# Patient Record
Sex: MALE | Race: BLACK OR AFRICAN AMERICAN | NOT HISPANIC OR LATINO | Employment: FULL TIME | URBAN - METROPOLITAN AREA
[De-identification: names, ages, dates, MRNs, and addresses within clinical notes are randomized per-mention and may not be internally consistent; named-entity substitution may affect disease eponyms.]

---

## 2024-07-18 ENCOUNTER — APPOINTMENT (OUTPATIENT)
Dept: RADIOLOGY | Facility: HOSPITAL | Age: 54
DRG: 309 | End: 2024-07-18
Payer: COMMERCIAL

## 2024-07-18 ENCOUNTER — APPOINTMENT (OUTPATIENT)
Dept: CARDIOLOGY | Facility: HOSPITAL | Age: 54
DRG: 309 | End: 2024-07-18
Payer: COMMERCIAL

## 2024-07-18 ENCOUNTER — HOSPITAL ENCOUNTER (INPATIENT)
Facility: HOSPITAL | Age: 54
LOS: 1 days | Discharge: HOME | DRG: 309 | End: 2024-07-19
Attending: EMERGENCY MEDICINE | Admitting: INTERNAL MEDICINE
Payer: COMMERCIAL

## 2024-07-18 DIAGNOSIS — I48.92 ATRIAL FLUTTER, UNSPECIFIED TYPE (MULTI): Primary | ICD-10-CM

## 2024-07-18 LAB
ALBUMIN SERPL BCP-MCNC: 3.7 G/DL (ref 3.4–5)
ALP SERPL-CCNC: 94 U/L (ref 33–120)
ALT SERPL W P-5'-P-CCNC: 25 U/L (ref 10–52)
ANION GAP SERPL CALC-SCNC: 14 MMOL/L (ref 10–20)
APTT PPP: 34 SECONDS (ref 27–38)
AST SERPL W P-5'-P-CCNC: 22 U/L (ref 9–39)
BASOPHILS # BLD AUTO: 0.03 X10*3/UL (ref 0–0.1)
BASOPHILS NFR BLD AUTO: 0.6 %
BILIRUB SERPL-MCNC: 0.9 MG/DL (ref 0–1.2)
BNP SERPL-MCNC: 574 PG/ML (ref 0–99)
BUN SERPL-MCNC: 17 MG/DL (ref 6–23)
CALCIUM SERPL-MCNC: 8.2 MG/DL (ref 8.6–10.3)
CARDIAC TROPONIN I PNL SERPL HS: 6 NG/L (ref 0–20)
CHLORIDE SERPL-SCNC: 104 MMOL/L (ref 98–107)
CO2 SERPL-SCNC: 24 MMOL/L (ref 21–32)
CREAT SERPL-MCNC: 1.7 MG/DL (ref 0.5–1.3)
EGFRCR SERPLBLD CKD-EPI 2021: 47 ML/MIN/1.73M*2
EOSINOPHIL # BLD AUTO: 0.06 X10*3/UL (ref 0–0.7)
EOSINOPHIL NFR BLD AUTO: 1.2 %
ERYTHROCYTE [DISTWIDTH] IN BLOOD BY AUTOMATED COUNT: 14.3 % (ref 11.5–14.5)
GLUCOSE SERPL-MCNC: 87 MG/DL (ref 74–99)
HCT VFR BLD AUTO: 40.5 % (ref 41–52)
HGB BLD-MCNC: 13.5 G/DL (ref 13.5–17.5)
IMM GRANULOCYTES # BLD AUTO: 0.02 X10*3/UL (ref 0–0.7)
IMM GRANULOCYTES NFR BLD AUTO: 0.4 % (ref 0–0.9)
LYMPHOCYTES # BLD AUTO: 1.75 X10*3/UL (ref 1.2–4.8)
LYMPHOCYTES NFR BLD AUTO: 33.7 %
MCH RBC QN AUTO: 28.4 PG (ref 26–34)
MCHC RBC AUTO-ENTMCNC: 33.3 G/DL (ref 32–36)
MCV RBC AUTO: 85 FL (ref 80–100)
MONOCYTES # BLD AUTO: 0.57 X10*3/UL (ref 0.1–1)
MONOCYTES NFR BLD AUTO: 11 %
NEUTROPHILS # BLD AUTO: 2.76 X10*3/UL (ref 1.2–7.7)
NEUTROPHILS NFR BLD AUTO: 53.1 %
NRBC BLD-RTO: 0.4 /100 WBCS (ref 0–0)
PLATELET # BLD AUTO: 241 X10*3/UL (ref 150–450)
POTASSIUM SERPL-SCNC: 3.8 MMOL/L (ref 3.5–5.3)
PROT SERPL-MCNC: 6.9 G/DL (ref 6.4–8.2)
RBC # BLD AUTO: 4.76 X10*6/UL (ref 4.5–5.9)
SODIUM SERPL-SCNC: 138 MMOL/L (ref 136–145)
WBC # BLD AUTO: 5.2 X10*3/UL (ref 4.4–11.3)

## 2024-07-18 PROCEDURE — 85025 COMPLETE CBC W/AUTO DIFF WBC: CPT

## 2024-07-18 PROCEDURE — 96366 THER/PROPH/DIAG IV INF ADDON: CPT

## 2024-07-18 PROCEDURE — 83880 ASSAY OF NATRIURETIC PEPTIDE: CPT

## 2024-07-18 PROCEDURE — 96376 TX/PRO/DX INJ SAME DRUG ADON: CPT

## 2024-07-18 PROCEDURE — G0378 HOSPITAL OBSERVATION PER HR: HCPCS

## 2024-07-18 PROCEDURE — 36415 COLL VENOUS BLD VENIPUNCTURE: CPT | Performed by: NURSE PRACTITIONER

## 2024-07-18 PROCEDURE — 2500000004 HC RX 250 GENERAL PHARMACY W/ HCPCS (ALT 636 FOR OP/ED): Performed by: EMERGENCY MEDICINE

## 2024-07-18 PROCEDURE — 80053 COMPREHEN METABOLIC PANEL: CPT

## 2024-07-18 PROCEDURE — 2500000001 HC RX 250 WO HCPCS SELF ADMINISTERED DRUGS (ALT 637 FOR MEDICARE OP): Performed by: EMERGENCY MEDICINE

## 2024-07-18 PROCEDURE — 85520 HEPARIN ASSAY: CPT | Performed by: NURSE PRACTITIONER

## 2024-07-18 PROCEDURE — 96365 THER/PROPH/DIAG IV INF INIT: CPT

## 2024-07-18 PROCEDURE — 85730 THROMBOPLASTIN TIME PARTIAL: CPT | Performed by: EMERGENCY MEDICINE

## 2024-07-18 PROCEDURE — 2500000005 HC RX 250 GENERAL PHARMACY W/O HCPCS

## 2024-07-18 PROCEDURE — 2500000001 HC RX 250 WO HCPCS SELF ADMINISTERED DRUGS (ALT 637 FOR MEDICARE OP): Performed by: NURSE PRACTITIONER

## 2024-07-18 PROCEDURE — 84484 ASSAY OF TROPONIN QUANT: CPT

## 2024-07-18 PROCEDURE — 71045 X-RAY EXAM CHEST 1 VIEW: CPT | Mod: FOREIGN READ | Performed by: RADIOLOGY

## 2024-07-18 PROCEDURE — 83036 HEMOGLOBIN GLYCOSYLATED A1C: CPT | Mod: AHULAB | Performed by: NURSE PRACTITIONER

## 2024-07-18 PROCEDURE — 1200000002 HC GENERAL ROOM WITH TELEMETRY DAILY

## 2024-07-18 PROCEDURE — 71045 X-RAY EXAM CHEST 1 VIEW: CPT

## 2024-07-18 PROCEDURE — 93005 ELECTROCARDIOGRAM TRACING: CPT

## 2024-07-18 PROCEDURE — 99223 1ST HOSP IP/OBS HIGH 75: CPT | Performed by: NURSE PRACTITIONER

## 2024-07-18 PROCEDURE — 96375 TX/PRO/DX INJ NEW DRUG ADDON: CPT

## 2024-07-18 PROCEDURE — 36415 COLL VENOUS BLD VENIPUNCTURE: CPT | Performed by: EMERGENCY MEDICINE

## 2024-07-18 PROCEDURE — 99291 CRITICAL CARE FIRST HOUR: CPT | Performed by: EMERGENCY MEDICINE

## 2024-07-18 PROCEDURE — 99285 EMERGENCY DEPT VISIT HI MDM: CPT | Mod: 25

## 2024-07-18 PROCEDURE — 36415 COLL VENOUS BLD VENIPUNCTURE: CPT

## 2024-07-18 PROCEDURE — 96374 THER/PROPH/DIAG INJ IV PUSH: CPT | Mod: 59

## 2024-07-18 PROCEDURE — 2500000004 HC RX 250 GENERAL PHARMACY W/ HCPCS (ALT 636 FOR OP/ED)

## 2024-07-18 RX ORDER — HEPARIN SODIUM 10000 [USP'U]/100ML
0-4500 INJECTION, SOLUTION INTRAVENOUS CONTINUOUS
Status: DISCONTINUED | OUTPATIENT
Start: 2024-07-18 | End: 2024-07-19

## 2024-07-18 RX ORDER — METOPROLOL TARTRATE 1 MG/ML
INJECTION, SOLUTION INTRAVENOUS
Status: COMPLETED
Start: 2024-07-18 | End: 2024-07-18

## 2024-07-18 RX ORDER — DILTIAZEM HYDROCHLORIDE 5 MG/ML
20 INJECTION INTRAVENOUS ONCE
Status: COMPLETED | OUTPATIENT
Start: 2024-07-18 | End: 2024-07-18

## 2024-07-18 RX ORDER — DILTIAZEM HCL/D5W 125 MG/125
5-15 PLASTIC BAG, INJECTION (ML) INTRAVENOUS CONTINUOUS
Status: DISCONTINUED | OUTPATIENT
Start: 2024-07-18 | End: 2024-07-18

## 2024-07-18 RX ORDER — ACETAMINOPHEN 325 MG/1
650 TABLET ORAL EVERY 4 HOURS PRN
Status: DISCONTINUED | OUTPATIENT
Start: 2024-07-18 | End: 2024-07-19 | Stop reason: HOSPADM

## 2024-07-18 RX ORDER — METOPROLOL TARTRATE 25 MG/1
25 TABLET, FILM COATED ORAL EVERY 8 HOURS
Status: DISCONTINUED | OUTPATIENT
Start: 2024-07-18 | End: 2024-07-18

## 2024-07-18 RX ORDER — METOPROLOL TARTRATE 1 MG/ML
5 INJECTION, SOLUTION INTRAVENOUS ONCE
Status: COMPLETED | OUTPATIENT
Start: 2024-07-18 | End: 2024-07-18

## 2024-07-18 RX ORDER — AMLODIPINE BESYLATE 10 MG/1
10 TABLET ORAL DAILY
Status: DISCONTINUED | OUTPATIENT
Start: 2024-07-18 | End: 2024-07-19 | Stop reason: HOSPADM

## 2024-07-18 RX ORDER — HYDROCHLOROTHIAZIDE 12.5 MG/1
12.5 TABLET ORAL DAILY
COMMUNITY

## 2024-07-18 RX ORDER — LOSARTAN POTASSIUM 100 MG/1
100 TABLET ORAL DAILY
COMMUNITY

## 2024-07-18 RX ORDER — OMEPRAZOLE 40 MG/1
40 CAPSULE, DELAYED RELEASE ORAL
COMMUNITY

## 2024-07-18 RX ORDER — HYDROCHLOROTHIAZIDE 12.5 MG/1
12.5 TABLET ORAL DAILY
Status: DISCONTINUED | OUTPATIENT
Start: 2024-07-18 | End: 2024-07-19 | Stop reason: HOSPADM

## 2024-07-18 RX ORDER — METOPROLOL TARTRATE 25 MG/1
25 TABLET, FILM COATED ORAL EVERY 6 HOURS
Status: DISCONTINUED | OUTPATIENT
Start: 2024-07-18 | End: 2024-07-19

## 2024-07-18 SDOH — SOCIAL STABILITY: SOCIAL INSECURITY: DO YOU FEEL UNSAFE GOING BACK TO THE PLACE WHERE YOU ARE LIVING?: NO

## 2024-07-18 SDOH — SOCIAL STABILITY: SOCIAL INSECURITY: HAS ANYONE EVER THREATENED TO HURT YOUR FAMILY OR YOUR PETS?: NO

## 2024-07-18 SDOH — SOCIAL STABILITY: SOCIAL INSECURITY: DOES ANYONE TRY TO KEEP YOU FROM HAVING/CONTACTING OTHER FRIENDS OR DOING THINGS OUTSIDE YOUR HOME?: NO

## 2024-07-18 SDOH — SOCIAL STABILITY: SOCIAL INSECURITY: HAVE YOU HAD ANY THOUGHTS OF HARMING ANYONE ELSE?: NO

## 2024-07-18 SDOH — SOCIAL STABILITY: SOCIAL INSECURITY: DO YOU FEEL ANYONE HAS EXPLOITED OR TAKEN ADVANTAGE OF YOU FINANCIALLY OR OF YOUR PERSONAL PROPERTY?: NO

## 2024-07-18 SDOH — SOCIAL STABILITY: SOCIAL INSECURITY: ARE THERE ANY APPARENT SIGNS OF INJURIES/BEHAVIORS THAT COULD BE RELATED TO ABUSE/NEGLECT?: NO

## 2024-07-18 SDOH — SOCIAL STABILITY: SOCIAL INSECURITY: ABUSE: ADULT

## 2024-07-18 SDOH — SOCIAL STABILITY: SOCIAL INSECURITY: WERE YOU ABLE TO COMPLETE ALL THE BEHAVIORAL HEALTH SCREENINGS?: YES

## 2024-07-18 SDOH — SOCIAL STABILITY: SOCIAL INSECURITY: ARE YOU OR HAVE YOU BEEN THREATENED OR ABUSED PHYSICALLY, EMOTIONALLY, OR SEXUALLY BY ANYONE?: NO

## 2024-07-18 SDOH — SOCIAL STABILITY: SOCIAL INSECURITY: HAVE YOU HAD THOUGHTS OF HARMING ANYONE ELSE?: NO

## 2024-07-18 ASSESSMENT — COGNITIVE AND FUNCTIONAL STATUS - GENERAL
DAILY ACTIVITIY SCORE: 24
PATIENT BASELINE BEDBOUND: NO
MOBILITY SCORE: 24

## 2024-07-18 ASSESSMENT — ACTIVITIES OF DAILY LIVING (ADL)
ADEQUATE_TO_COMPLETE_ADL: YES
TOILETING: INDEPENDENT
LACK_OF_TRANSPORTATION: NO
BATHING: INDEPENDENT
GROOMING: INDEPENDENT
HEARING - RIGHT EAR: FUNCTIONAL
FEEDING YOURSELF: INDEPENDENT
WALKS IN HOME: INDEPENDENT
JUDGMENT_ADEQUATE_SAFELY_COMPLETE_DAILY_ACTIVITIES: YES
PATIENT'S MEMORY ADEQUATE TO SAFELY COMPLETE DAILY ACTIVITIES?: YES
DRESSING YOURSELF: INDEPENDENT
HEARING - LEFT EAR: FUNCTIONAL

## 2024-07-18 ASSESSMENT — LIFESTYLE VARIABLES
HOW OFTEN DO YOU HAVE 6 OR MORE DRINKS ON ONE OCCASION: NEVER
HOW MANY STANDARD DRINKS CONTAINING ALCOHOL DO YOU HAVE ON A TYPICAL DAY: 1 OR 2
SKIP TO QUESTIONS 9-10: 1
AUDIT-C TOTAL SCORE: 1
AUDIT-C TOTAL SCORE: 1
HOW OFTEN DO YOU HAVE A DRINK CONTAINING ALCOHOL: MONTHLY OR LESS

## 2024-07-18 ASSESSMENT — COLUMBIA-SUICIDE SEVERITY RATING SCALE - C-SSRS
1. IN THE PAST MONTH, HAVE YOU WISHED YOU WERE DEAD OR WISHED YOU COULD GO TO SLEEP AND NOT WAKE UP?: NO
6. HAVE YOU EVER DONE ANYTHING, STARTED TO DO ANYTHING, OR PREPARED TO DO ANYTHING TO END YOUR LIFE?: NO
2. HAVE YOU ACTUALLY HAD ANY THOUGHTS OF KILLING YOURSELF?: NO

## 2024-07-18 ASSESSMENT — ENCOUNTER SYMPTOMS
ACTIVITY CHANGE: 1
PALPITATIONS: 1
SHORTNESS OF BREATH: 1
MUSCULOSKELETAL NEGATIVE: 1
PSYCHIATRIC NEGATIVE: 1
NEUROLOGICAL NEGATIVE: 1

## 2024-07-18 ASSESSMENT — PAIN - FUNCTIONAL ASSESSMENT: PAIN_FUNCTIONAL_ASSESSMENT: 0-10

## 2024-07-18 ASSESSMENT — PATIENT HEALTH QUESTIONNAIRE - PHQ9
2. FEELING DOWN, DEPRESSED OR HOPELESS: NOT AT ALL
SUM OF ALL RESPONSES TO PHQ9 QUESTIONS 1 & 2: 0
1. LITTLE INTEREST OR PLEASURE IN DOING THINGS: NOT AT ALL

## 2024-07-18 ASSESSMENT — PAIN SCALES - GENERAL: PAINLEVEL_OUTOF10: 0 - NO PAIN

## 2024-07-18 NOTE — ED TRIAGE NOTES
Patient to ED for c/o rapid heart rate. Patient states he was at work and felt palpitations. Patient denies CP but c/o sob. Patient denies being on blood thinners. Patient with hx of hypertension.

## 2024-07-18 NOTE — ED PROVIDER NOTES
Emergency Department Provider Note        History of Present Illness     History provided by: Patient  Limitations to History: None  External Records Reviewed with Brief Summary: None    HPI:  Alonso Lackey is a 54 y.o. male with PMHx htn, CKD, SVT who presents to ED with palpitations and shortness of breath. He reports the symptoms started when he woke up. Denies chest pain/pressure, abd pain, lightheadedness.     No history of MI, no known family history of WPW or sudden cardiac death. He was seen by cardiology on 24 and at that time LV EF 62% with grade I diastolic dysfunction.    Physical Exam   Triage vitals:  T 36.7 °C (98.1 °F)  HR (!) 163  BP (!) 130/109  RR 20  O2 99 % None (Room air)    Physical exam:   Triage vitals reviewed.  Constitutional: Well developed adult in no acute distress, non toxic in appearance  Head: Normocephalic, atraumatic  Skin: Intact, dry. No rashes or lesions.  Eyes: Pupils are equal. No conjunctival injection.  Neck: Supple. Trachea is midline.  Pulmonary: Normal work of breathing with no accessory muscle use noted.  Clear to auscultation bilaterally.   Cardiovascular: Tachycardia, regular rhythm. No murmurs/gallops/rubs appreciated. 2+ radial pulses bilaterally.   Abdomen: Soft, nondistended. Nontender to palpation.  Extremities: No gross deformities.  Moving all extremities spontaneously without difficulty.  Neuro: Awake and alert. Face is symmetric. Speech is clear. No obvious focal findings.  Psych: Appropriate mood and affect.      Medical Decision Making & ED Course   Medical Decision Makin y.o. male with PMHx PMHx htn, CKD, SVT who presents to ED with palpitations. On arrival, patient was afebrile, normotensive, with HR 160s and SpO2 99% on RA. Initial EKG concerning for atrial flutter with 2:1 block versus SVT. We were unable to convert the patient with vagal maneuvers, however flutter waves were visible on the twelve-lead monitor while performing vagal  maneuvers, suggesting atrial flutter rather than SVT.  Patient was given 20 mg diltiazem, and briefly converted to normal sinus rhythm, however his heart rate continued to rise, and he went back into atrial flutter.  Patient was started on diltiazem drip and cardiology was consulted.     Lab workup was significant for .  EKG without evidence of ischemia, and troponin was within normal limits, so low concern for acute MI.  CBC without evidence of leukocytosis or anemia, which is reassuring against infection. CMP significant for KVNG with Cr 1.7 (baseline 1.5).    Cardiology recommended stopping diltiazem drip given elevated BNP, and starting metoprolol titrate 25 mg BID and anticoagulation with heparin gtt.     I discussed the likely diagnosis and the implications of this with the patient, who expressed understanding.  I recommended that he be admitted to the hospital for management, and we discussed the risk of stasis and clot formation in atrial flutter.  Ultimately, patient was agreeable with admission.    Patient was discussed with internal medicine, and was accepted for admission.  ----    Differential diagnoses considered include but are not limited to: SVT, atrial flutter, PE     Social Determinants of Health which Significantly Impact Care: None identified     EKG Independent Interpretation: EKG interpreted by myself. Please see ED Course for full interpretation.    Independent Result Review and Interpretation: Relevant laboratory and radiographic results were reviewed and independently interpreted by myself.  As necessary, they are commented on in the ED Course.    Chronic conditions affecting the patient's care: As documented above in [facility]    The patient was discussed with the following consultants/services: Hospitalist/Admitting Provider who accepted the patient for admission    Care Considerations: As documented above in [facility]    ED Course:  ED Course as of 07/18/24 2020   Thu Jul 18, 2024   1548 ECG  12 lead  I independently interpreted the EKG (EM resident): 163 bpm. Atrial flutter with 2:1 block vs narrow-complex SVT. Normal Qtc interval. No ST segment elevations, depressions, or T wave inversions concerning for ischemia. No prior EKG available for comparison. []   1752 ECG 12 lead  I independently interpreted the EKG (EM resident): 85 bpm, atrial flutter with variable AV block and occasional PVCs. QTc 509. No ST segment elevations, depressions, or T wave inversions concerning for ischemia. Compared to prior EKG on 7/18/24, atrial flutter now with variable block and ventricular rate is normal.   []   1819 BNP(!): 574 []   1819 XR chest 1 view  I independently interpreted the CXR without evidence of PTX, PNA, or widened mediastinum. []      ED Course User Index  [HH] Alida Garg MD         Diagnoses as of 07/18/24 2020   Atrial flutter, unspecified type (Multi)     Disposition   As a result of their workup, the patient will require admission to the hospital.  The patient was informed of his diagnosis.  The patient was given the opportunity to ask questions and I answered them. The patient agreed to be admitted to the hospital.    Patient seen and discussed with ED attending physician Dr. Gar.    Alida Garg MD  Emergency Medicine       Alida Garg MD  Resident  07/18/24 2024

## 2024-07-18 NOTE — H&P
History Of Present Illness  Alonso Lackey is a 54 y.o. male with a past medical history of CKD, SVT, hypertension, hemorrhoids presenting with palpitations and shortness of breath that started while patient was at work this a.m.  EKG was obtained upon arrival revealed a flutter with 2-1 AV conduction with a rate of 163 bpm.  Patient was treated with 20 mg of IV Cardizem which improved the rate however he went back up into the 170s and was given 5 mg of IV metoprolol.  It appears patient converted back to normal sinus rhythm noted on ER cardiac monitor, after dose of IV metoprolol.  Patient has been started on a heparin drip.  Patient BUN 17 creatinine 1.7, otherwise CMP unremarkable, troponin 6, , WBC 5.2, hemoglobin 13.5, hematocrit 40.5, platelets 241.  Chest x-ray with no acute cardiopulmonary disease.  Patient reports that shortness of breath improved.  Patient is without chest pain, abdominal pain, nausea, vomiting or diarrhea.  Cardiology has been consulted patient will be admitted for further workup.     Past Medical History  He has a past medical history of Hemorrhage, not elsewhere classified (12/06/2016) and Other hemorrhoids (12/06/2016).    Surgical History  He has no past surgical history on file.     Social History  He has no history on file for tobacco use, alcohol use, and drug use.    Family History  No family history on file.     Allergies  Patient has no allergy information on record.    Review of Systems   Constitutional:  Positive for activity change.   HENT: Negative.     Respiratory:  Positive for shortness of breath.    Cardiovascular:  Positive for palpitations.   Genitourinary: Negative.    Musculoskeletal: Negative.    Skin: Negative.    Neurological: Negative.    Psychiatric/Behavioral: Negative.          Physical Exam  HENT:      Head: Normocephalic.      Mouth/Throat:      Mouth: Mucous membranes are moist.   Cardiovascular:      Rate and Rhythm: Tachycardia present. Rhythm  irregular.      Pulses: Normal pulses.   Pulmonary:      Effort: Pulmonary effort is normal.   Abdominal:      General: Bowel sounds are normal.      Palpations: Abdomen is soft.   Musculoskeletal:         General: Normal range of motion.      Cervical back: Neck supple.   Skin:     General: Skin is warm.   Neurological:      Mental Status: He is alert and oriented to person, place, and time.   Psychiatric:         Mood and Affect: Mood normal.         Behavior: Behavior normal.          Last Recorded Vitals  BP (!) 147/111   Pulse 82   Temp 36.7 °C (98.1 °F)   Resp 20   Wt 113 kg (250 lb)   SpO2 98%     Relevant Results      Results for orders placed or performed during the hospital encounter of 07/18/24 (from the past 24 hour(s))   CBC and Auto Differential   Result Value Ref Range    WBC 5.2 4.4 - 11.3 x10*3/uL    nRBC 0.4 (H) 0.0 - 0.0 /100 WBCs    RBC 4.76 4.50 - 5.90 x10*6/uL    Hemoglobin 13.5 13.5 - 17.5 g/dL    Hematocrit 40.5 (L) 41.0 - 52.0 %    MCV 85 80 - 100 fL    MCH 28.4 26.0 - 34.0 pg    MCHC 33.3 32.0 - 36.0 g/dL    RDW 14.3 11.5 - 14.5 %    Platelets 241 150 - 450 x10*3/uL    Neutrophils % 53.1 40.0 - 80.0 %    Immature Granulocytes %, Automated 0.4 0.0 - 0.9 %    Lymphocytes % 33.7 13.0 - 44.0 %    Monocytes % 11.0 2.0 - 10.0 %    Eosinophils % 1.2 0.0 - 6.0 %    Basophils % 0.6 0.0 - 2.0 %    Neutrophils Absolute 2.76 1.20 - 7.70 x10*3/uL    Immature Granulocytes Absolute, Automated 0.02 0.00 - 0.70 x10*3/uL    Lymphocytes Absolute 1.75 1.20 - 4.80 x10*3/uL    Monocytes Absolute 0.57 0.10 - 1.00 x10*3/uL    Eosinophils Absolute 0.06 0.00 - 0.70 x10*3/uL    Basophils Absolute 0.03 0.00 - 0.10 x10*3/uL   Comprehensive metabolic panel   Result Value Ref Range    Glucose 87 74 - 99 mg/dL    Sodium 138 136 - 145 mmol/L    Potassium 3.8 3.5 - 5.3 mmol/L    Chloride 104 98 - 107 mmol/L    Bicarbonate 24 21 - 32 mmol/L    Anion Gap 14 10 - 20 mmol/L    Urea Nitrogen 17 6 - 23 mg/dL    Creatinine  1.70 (H) 0.50 - 1.30 mg/dL    eGFR 47 (L) >60 mL/min/1.73m*2    Calcium 8.2 (L) 8.6 - 10.3 mg/dL    Albumin 3.7 3.4 - 5.0 g/dL    Alkaline Phosphatase 94 33 - 120 U/L    Total Protein 6.9 6.4 - 8.2 g/dL    AST 22 9 - 39 U/L    Bilirubin, Total 0.9 0.0 - 1.2 mg/dL    ALT 25 10 - 52 U/L   Troponin I, High Sensitivity   Result Value Ref Range    Troponin I, High Sensitivity 6 0 - 20 ng/L   B-Type Natriuretic Peptide   Result Value Ref Range     (H) 0 - 99 pg/mL          Assessment/Plan   Principal Problem:    Atrial flutter, unspecified type (Multi)  Plan:  Metoprolol 25 mg every 6 hours started in the emergency department  Continue heparin drip  cardiology consult  Telemetry  Echocardiogram    Hypertension-continue home amlodipine, hydrochlorothiazide    SVT?  -Patient reports being on a short course of metoprolol after a syncopal episode last year    CKD-monitor      DVT prophylaxis-patient started on heparin drip       Dmitriy Belle, APRN-CNP

## 2024-07-19 ENCOUNTER — APPOINTMENT (OUTPATIENT)
Dept: CARDIOLOGY | Facility: HOSPITAL | Age: 54
DRG: 309 | End: 2024-07-19
Payer: COMMERCIAL

## 2024-07-19 ENCOUNTER — PHARMACY VISIT (OUTPATIENT)
Dept: PHARMACY | Facility: CLINIC | Age: 54
End: 2024-07-19
Payer: MEDICARE

## 2024-07-19 VITALS
RESPIRATION RATE: 24 BRPM | BODY MASS INDEX: 37.36 KG/M2 | OXYGEN SATURATION: 100 % | WEIGHT: 266.9 LBS | HEART RATE: 88 BPM | HEIGHT: 71 IN | DIASTOLIC BLOOD PRESSURE: 90 MMHG | SYSTOLIC BLOOD PRESSURE: 138 MMHG | TEMPERATURE: 98.8 F

## 2024-07-19 LAB
ANION GAP SERPL CALC-SCNC: 11 MMOL/L (ref 10–20)
AORTIC VALVE MEAN GRADIENT: 3 MMHG
AORTIC VALVE PEAK VELOCITY: 1.38 M/S
ATRIAL RATE: 326 BPM
ATRIAL RATE: 340 BPM
AV PEAK GRADIENT: 7.6 MMHG
AVA (PEAK VEL): 1.41 CM2
AVA (VTI): 1.56 CM2
BUN SERPL-MCNC: 16 MG/DL (ref 6–23)
CALCIUM SERPL-MCNC: 8.2 MG/DL (ref 8.6–10.3)
CHLORIDE SERPL-SCNC: 105 MMOL/L (ref 98–107)
CO2 SERPL-SCNC: 25 MMOL/L (ref 21–32)
CREAT SERPL-MCNC: 1.51 MG/DL (ref 0.5–1.3)
EGFRCR SERPLBLD CKD-EPI 2021: 54 ML/MIN/1.73M*2
EJECTION FRACTION APICAL 4 CHAMBER: 64.8
EJECTION FRACTION: 67 %
ERYTHROCYTE [DISTWIDTH] IN BLOOD BY AUTOMATED COUNT: 14.3 % (ref 11.5–14.5)
EST. AVERAGE GLUCOSE BLD GHB EST-MCNC: 123 MG/DL
GLUCOSE SERPL-MCNC: 89 MG/DL (ref 74–99)
HBA1C MFR BLD: 5.9 %
HCT VFR BLD AUTO: 36.8 % (ref 41–52)
HGB BLD-MCNC: 12.2 G/DL (ref 13.5–17.5)
LEFT ATRIUM VOLUME AREA LENGTH INDEX BSA: 29.3 ML/M2
LEFT VENTRICLE INTERNAL DIMENSION DIASTOLE: 5.4 CM (ref 3.5–6)
LEFT VENTRICULAR OUTFLOW TRACT DIAMETER: 2 CM
MAGNESIUM SERPL-MCNC: 1.6 MG/DL (ref 1.6–2.4)
MCH RBC QN AUTO: 28.6 PG (ref 26–34)
MCHC RBC AUTO-ENTMCNC: 33.2 G/DL (ref 32–36)
MCV RBC AUTO: 86 FL (ref 80–100)
MITRAL VALVE E/A RATIO: 1.26
MITRAL VALVE E/E' RATIO: 5.3
NRBC BLD-RTO: 0 /100 WBCS (ref 0–0)
P AXIS: -83 DEGREES
P OFFSET: 172 MS
P ONSET: 121 MS
PLATELET # BLD AUTO: 195 X10*3/UL (ref 150–450)
POTASSIUM SERPL-SCNC: 3.5 MMOL/L (ref 3.5–5.3)
Q ONSET: 211 MS
Q ONSET: 212 MS
QRS COUNT: 14 BEATS
QRS COUNT: 27 BEATS
QRS DURATION: 118 MS
QRS DURATION: 90 MS
QT INTERVAL: 318 MS
QT INTERVAL: 428 MS
QTC CALCULATION(BAZETT): 509 MS
QTC CALCULATION(BAZETT): 523 MS
QTC FREDERICIA: 443 MS
QTC FREDERICIA: 480 MS
R AXIS: -7 DEGREES
R AXIS: 4 DEGREES
RBC # BLD AUTO: 4.27 X10*6/UL (ref 4.5–5.9)
RIGHT VENTRICLE FREE WALL PEAK S': 14.3 CM/S
RIGHT VENTRICLE PEAK SYSTOLIC PRESSURE: 45.8 MMHG
SODIUM SERPL-SCNC: 137 MMOL/L (ref 136–145)
T AXIS: -31 DEGREES
T AXIS: 97 DEGREES
T OFFSET: 371 MS
T OFFSET: 425 MS
TRICUSPID ANNULAR PLANE SYSTOLIC EXCURSION: 2.4 CM
TSH SERPL-ACNC: 0.85 MIU/L (ref 0.44–3.98)
UFH PPP CHRO-ACNC: 0.8 IU/ML
UFH PPP CHRO-ACNC: 1.2 IU/ML
VENTRICULAR RATE: 163 BPM
VENTRICULAR RATE: 85 BPM
WBC # BLD AUTO: 6 X10*3/UL (ref 4.4–11.3)

## 2024-07-19 PROCEDURE — 2500000001 HC RX 250 WO HCPCS SELF ADMINISTERED DRUGS (ALT 637 FOR MEDICARE OP): Performed by: NURSE PRACTITIONER

## 2024-07-19 PROCEDURE — 82374 ASSAY BLOOD CARBON DIOXIDE: CPT | Performed by: NURSE PRACTITIONER

## 2024-07-19 PROCEDURE — 2500000004 HC RX 250 GENERAL PHARMACY W/ HCPCS (ALT 636 FOR OP/ED): Performed by: NURSE PRACTITIONER

## 2024-07-19 PROCEDURE — 85027 COMPLETE CBC AUTOMATED: CPT | Performed by: NURSE PRACTITIONER

## 2024-07-19 PROCEDURE — 83735 ASSAY OF MAGNESIUM: CPT | Performed by: NURSE PRACTITIONER

## 2024-07-19 PROCEDURE — RXMED WILLOW AMBULATORY MEDICATION CHARGE

## 2024-07-19 PROCEDURE — 99222 1ST HOSP IP/OBS MODERATE 55: CPT

## 2024-07-19 PROCEDURE — 96366 THER/PROPH/DIAG IV INF ADDON: CPT | Mod: 59

## 2024-07-19 PROCEDURE — 96375 TX/PRO/DX INJ NEW DRUG ADDON: CPT | Mod: 59

## 2024-07-19 PROCEDURE — 36415 COLL VENOUS BLD VENIPUNCTURE: CPT | Performed by: NURSE PRACTITIONER

## 2024-07-19 PROCEDURE — 85520 HEPARIN ASSAY: CPT | Performed by: NURSE PRACTITIONER

## 2024-07-19 PROCEDURE — 93306 TTE W/DOPPLER COMPLETE: CPT | Performed by: INTERNAL MEDICINE

## 2024-07-19 PROCEDURE — 99239 HOSP IP/OBS DSCHRG MGMT >30: CPT | Performed by: INTERNAL MEDICINE

## 2024-07-19 PROCEDURE — G0378 HOSPITAL OBSERVATION PER HR: HCPCS

## 2024-07-19 PROCEDURE — 84443 ASSAY THYROID STIM HORMONE: CPT | Performed by: NURSE PRACTITIONER

## 2024-07-19 PROCEDURE — 93306 TTE W/DOPPLER COMPLETE: CPT

## 2024-07-19 RX ORDER — METOPROLOL SUCCINATE 100 MG/1
100 TABLET, EXTENDED RELEASE ORAL DAILY
Qty: 30 TABLET | Refills: 2 | Status: SHIPPED | OUTPATIENT
Start: 2024-07-19 | End: 2024-10-17

## 2024-07-19 RX ORDER — LOSARTAN POTASSIUM 50 MG/1
100 TABLET ORAL DAILY
Status: DISCONTINUED | OUTPATIENT
Start: 2024-07-19 | End: 2024-07-19 | Stop reason: HOSPADM

## 2024-07-19 RX ORDER — AMLODIPINE BESYLATE 10 MG/1
10 TABLET ORAL DAILY
Qty: 30 TABLET | Refills: 1 | Status: SHIPPED | OUTPATIENT
Start: 2024-07-20 | End: 2024-09-18

## 2024-07-19 RX ORDER — FUROSEMIDE 10 MG/ML
20 INJECTION INTRAMUSCULAR; INTRAVENOUS ONCE
Status: COMPLETED | OUTPATIENT
Start: 2024-07-19 | End: 2024-07-19

## 2024-07-19 RX ORDER — METOPROLOL SUCCINATE 50 MG/1
100 TABLET, EXTENDED RELEASE ORAL DAILY
Status: DISCONTINUED | OUTPATIENT
Start: 2024-07-19 | End: 2024-07-19 | Stop reason: HOSPADM

## 2024-07-19 ASSESSMENT — COGNITIVE AND FUNCTIONAL STATUS - GENERAL
MOBILITY SCORE: 24
DAILY ACTIVITIY SCORE: 24

## 2024-07-19 ASSESSMENT — ACTIVITIES OF DAILY LIVING (ADL): LACK_OF_TRANSPORTATION: NO

## 2024-07-19 ASSESSMENT — PAIN - FUNCTIONAL ASSESSMENT: PAIN_FUNCTIONAL_ASSESSMENT: 0-10

## 2024-07-19 ASSESSMENT — PAIN SCALES - GENERAL: PAINLEVEL_OUTOF10: 0 - NO PAIN

## 2024-07-19 NOTE — DISCHARGE SUMMARY
Discharge Diagnosis  Atrial flutter, unspecified type (Multi)        Discharge Meds     Your medication list        START taking these medications        Instructions Last Dose Given Next Dose Due   amLODIPine 10 mg tablet  Commonly known as: Norvasc  Start taking on: July 20, 2024      Take 1 tablet (10 mg) by mouth once daily.       apixaban 5 mg tablet  Commonly known as: Eliquis      Take 1 tablet (5 mg) by mouth every 12 hours.       metoprolol succinate  mg 24 hr tablet  Commonly known as: Toprol-XL      Take 1 tablet (100 mg) by mouth once daily. Do not crush or chew.              CONTINUE taking these medications        Instructions Last Dose Given Next Dose Due   hydroCHLOROthiazide 12.5 mg tablet  Commonly known as: Microzide           losartan 100 mg tablet  Commonly known as: Cozaar           omeprazole 40 mg DR capsule  Commonly known as: PriLOSEC                     Where to Get Your Medications        These medications were sent to [facility]  [address], [address], [place] [facility] [address]      Hours: 8 AM to 6 PM Mon-Fri, 9 AM to 1 PM Saturday Phone: [phone]   amLODIPine 10 mg tablet  apixaban 5 mg tablet  metoprolol succinate  mg 24 hr tablet         Test Results Pending At Discharge  Pending Labs       Order Current Status    Hemoglobin A1c In process            Hospital Course   Admitted for new onset a flutter. Now in NSR s/p CCB and BB. Started on eliquis. Echo is pending.     Once echo is complete and no actionable results will plan to dc home with toprol and eliquis along with f/up with cardiology which has been requested.     Pt clinically and hemodynamically stable, tolerating PO intake and room air.   Instructed to F/U with PCP within 5 days.   He understands and agrees with the dc plan.     More than 30 min spent on dc.       Pertinent Physical Exam At Time of Discharge  Physical Exam  Constitutional:       Appearance: Normal appearance.   Cardiovascular:       "Rate and Rhythm: Normal rate and regular rhythm.   Pulmonary:      Effort: Pulmonary effort is normal.      Breath sounds: Normal breath sounds.   Abdominal:      General: Abdomen is flat. Bowel sounds are normal.      Palpations: Abdomen is soft.   Musculoskeletal:      Cervical back: Normal range of motion.   Skin:     General: Skin is warm.   Neurological:      General: No focal deficit present.      Mental Status: He is alert and oriented to person, place, and time. Mental status is at baseline.   Psychiatric:         Mood and Affect: Mood normal.         Behavior: Behavior normal.         Thought Content: Thought content normal.         Judgment: Judgment normal.     /89 (BP Location: Right arm, Patient Position: Lying)   Pulse 88   Temp 37 °C (98.6 °F) (Temporal)   Resp 24   Ht 1.803 m (5' 11\")   Wt 121 kg (266 lb 14.4 oz)   SpO2 100%   BMI 37.22 kg/m²       Outpatient Follow-Up  No future appointments.      Lobo Benavidez MD  "

## 2024-07-19 NOTE — CARE PLAN
The patient's goals for the shift include      The clinical goals for the shift include to decrease heart palpitations      Problem: Fall/Injury  Goal: Not fall by end of shift  Outcome: Progressing  Goal: Be free from injury by end of the shift  Outcome: Progressing  Goal: Verbalize understanding of personal risk factors for fall in the hospital  Outcome: Progressing  Goal: Verbalize understanding of risk factor reduction measures to prevent injury from fall in the home  Outcome: Progressing  Goal: Use assistive devices by end of the shift  Outcome: Progressing  Goal: Pace activities to prevent fatigue by end of the shift  Outcome: Progressing

## 2024-07-19 NOTE — PROGRESS NOTES
Pharmacy Medication History Review    Alonso Lackey is a 54 y.o. male admitted for Atrial flutter, unspecified type (Multi). Pharmacy reviewed the patient's [id number] medications and allergies for accuracy.    The list below reflectives the updated PTA list. Please review each medication in order reconciliation for additional clarification and justification.  Prior to Admission Medications   Prescriptions Last Dose Informant   hydroCHLOROthiazide (Microzide) 12.5 mg tablet Unknown Self   Sig: Take 1 tablet (12.5 mg) by mouth once daily.   losartan (Cozaar) 100 mg tablet Unknown Self   Sig: Take 1 tablet (100 mg) by mouth once daily.   omeprazole (PriLOSEC) 40 mg DR capsule Unknown Self   Sig: Take 1 capsule (40 mg) by mouth once daily in the morning. Take before meals. Do not crush or chew.      Facility-Administered Medications: None         The list below reflectives the updated allergy list. Please review each documented allergy for additional clarification and justification.  Allergies  Reviewed by Benita Ford on 7/18/2024   No Known Allergies         Below are additional concerns with the patient's PTA list.  Spoke to patient with family at bedside.    Benita Ford

## 2024-07-19 NOTE — CONSULTS
Inpatient consult to cardiology  Consult performed by: KARIE Laws  Consult ordered by: KARIE King  Reason for consult: Stable atrial flutter on cardizem drip      History Of Present Illness:    Alonso Lackey is a 54 y.o. male with past medical history significant for Paroxysmal supraventricular tachycardia, Prior syncope, Hypertension, Chronic kidney disease.  Presented with shortness of breath. Cardiology is consulted for stable atrial flutter on Cardizem drip.      Patient reports he noted shortness of breath yesterday. Reports it was worse with exertion. Also endorsed occasional palpitations. Dyspnea on exertion persistent throughout the day.  Did noted some dizziness and lightheadedness on Wednesday but none yesterday. Denies any chest pain, orthopnea, PND, lower extremity edema, syncope, recent fever, cough, congestion, hematochezia or melena.  Given ongoing symptoms he presented to urgent care. At urgent care he was told he had heart rate was 160s so he was referred to the ED for further evaluation.      EKG on presentation showed atrial flutter 2:1 conduction  bpm no acute ischemic changes.  Chest x-ray interpret as no acute cardiopulmonary disease. High sensitivity troponin negative x1  BUN 17 creatinine 1.70 K 3.8     According to ED note, vagal maneuvers were attempted without success.  He was treated with diltiazem 20 mg  IVP. Reportedly he briefly converted to SR but reverted back to atrial flutter and subsequently started on diltiazem and  heparin drip. After discussion with cardiology, diltiazem was discontinued and he was started on  metoprolol tartrate 25 mg every 6 hours.     Follows with cardiology at [facility]. Last seen 2/29/2024 Dr. Alex Clements.  He has been followed for history of SVT. States he was initally started on metoprolol however this was discontinued after he wore a holter monitor (details below). Last echocardiogram done 8/2023 showed LVEF 62%  "grade I diastolic dysfunction.     Home CV meds   Losartan 100 mg daily  Amlodipine 10 mg daily  Hydrochlorothiazide 75 mg daily ??    Last Recorded Vitals:  Vitals:    24 2215 24 2230 24 2316 24 0300   BP: (!) 125/105 (!) 130/102 (!) 144/94 (!) 146/101   BP Location:   Right arm Right arm   Patient Position:    Lying   Pulse: 91 90 88 91   Resp:   20 21   Temp:    37 °C (98.6 °F)   TempSrc:   Temporal Temporal   SpO2: 94% 94% 98% 100%   Weight:   121 kg (266 lb 14.4 oz)    Height:   1.803 m (5' 11\")        Last Labs:  LABS:  CMP:  Results from last 7 days   Lab Units 24  1608   SODIUM mmol/L 138   POTASSIUM mmol/L 3.8   CHLORIDE mmol/L 104   CO2 mmol/L 24   ANION GAP mmol/L 14   BUN mg/dL 17   CREATININE mg/dL 1.70*   EGFR mL/min/1.73m*2 47*   ALBUMIN g/dL 3.7   ALT U/L 25   AST U/L 22   BILIRUBIN TOTAL mg/dL 0.9     CBC:  Results from last 7 days   Lab Units 24  1608   WBC AUTO x10*3/uL 5.2   HEMOGLOBIN g/dL 13.5   HEMATOCRIT % 40.5*   PLATELETS AUTO x10*3/uL 241   MCV fL 85     COAG:     ABO: No results found for: \"ABO\"  HEME/ENDO:     CARDIAC:   Results from last 7 days   Lab Units 24  1608   TROPHS ng/L 6   BNP pg/mL 574*   No results for input(s): \"CHOL\", \"LDLF\", \"LDL\", \"LDLCALC\", \"HDL\", \"TRIG\" in the last 20379 hours.     Imagine Results  XR chest 1 view   Final Result   No acute cardiopulmonary disease.   Signed by Adolfo Osuna MD      Transthoracic Echo (TTE) Complete    (Results Pending)        Last I/O:  No intake/output data recorded.    Past Cardiology Tests (Last 3 Years):  EK2024 16:24  Atrial flutter variable AV block HR 85bpm     2024 15:48  Atrial flutter RVR HR 163bpm 2:1 conduction.       [facility] Enrollment Dates: 2023-08/10/2023- Per records in Care Everywhere   Patient had a min HR of 53 bpm, max HR of 214 bpm, and avg HR of 89 bpm.  Predominant underlying rhythm was Sinus Rhythm. Slight P wave morphology changes were noted. 4 " Ventricular Tachycardia runs occurred, the run with the fastest interval lasting 6 beats with a max rate of 197 bpm (avg 173 bpm); the run with the fastest interval was also the longest. 57 Supraventricular Tachycardia runs occurred, the run with the fastest interval lasting 14 beats with a max rate of 214 bpm, the longest lasting 13.1 secs with an avg rate of 150 bpm. Isolated SVEs were rare (<1.0%), SVE Couplets were rare (<1.0%), and SVE Triplets were rare (<1.0%).  Isolated VEs were occasional (2.9%, 56954), VE Couplets were rare (<1.0%, 1554), and VE Triplets were rare (<1.0%, 17). Ventricular Bigeminy and Trigeminy were present.     Echo:  8/10/2023- [facility]- Per records in Care Everywhere  - Exam indication: SVT   - The left ventricle is normal in size. There is concentric left ventricular   hypertrophy. Left ventricular systolic function is normal. EF = 62 ± 5% (2D biplane) Grade I left ventricular diastolic dysfunction.   - The right ventricle is normal in size. Right ventricular systolic function is normal.   - The patient has not had a prior CC echocardiographic exam for comparison.     Cath:  No results found for this or any previous visit from the past 1095 days.    Stress Test:  No results found for this or any previous visit from the past 1095 days.    Cardiac Imaging:  No results found for this or any previous visit from the past 1095 days.      Past Medical History:  He has a past medical history of Hemorrhage, not elsewhere classified (12/06/2016) and Other hemorrhoids (12/06/2016).    Past Surgical History:  He has no past surgical history on file.      Social History:  He reports that he has never smoked. He has never been exposed to tobacco smoke. He has never used smokeless tobacco. No history on file for alcohol use and drug use.    Family History:  No family history on file.     Allergies:  Patient has no known allergies.    Inpatient Medications:  Scheduled medications   Medication  "Dose Route Frequency    amLODIPine  10 mg oral Daily    hydroCHLOROthiazide  12.5 mg oral Daily    metoprolol tartrate  25 mg oral q6h     PRN medications   Medication    acetaminophen    heparin     Continuous Medications   Medication Dose Last Rate    heparin  0-4,500 Units/hr 1,800 Units/hr (07/19/24 0240)     Outpatient Medications:  Current Outpatient Medications   Medication Instructions    hydroCHLOROthiazide (MICROZIDE) 12.5 mg, oral, Daily    losartan (COZAAR) 100 mg, oral, Daily    omeprazole (PRILOSEC) 40 mg, oral, Daily before breakfast, Do not crush or chew.       Physical Exam:  GENERAL: alert, cooperative, pleasant, in no acute distress  SKIN: warm, dry  NECK: +JVD  CARDIAC: Regular rate and rhythm no murmurs  PULMONARY: Normal respiratory efforts, bibasilar rales at bases, on room air   ABDOMEN: soft, nondistended  EXTREMITIES: no lower extremity edema  NEURO: Alert and oriented x 3.  Grossly normal.  Moves all 4 extremities.     I reviewed telemetry which showed sinus rhythm brief SVT lasting 6 seconds HR 150s at 05:23 am      Assessment/Plan   Alonso Lackey is a 54 y.o. male with past medical history significant for Paroxysmal supraventricular tachycardia, Prior syncope, Hypertension, Chronic kidney disease.  Presented with shortness of breath. Cardiology is consulted for stable atrial flutter on Cardizem drip.     #New onset atrial flutter  Initial EKG showed aflutter RVR.  Treated with diltiazem and metoprolol. Currently sinus rhythm on telemetry.   -Recommend obtaining echocardiogram   -[id number] score for Atrial Fibrillation Stroke Risk is 1  which puts pt at  \"low-moderate\" for thromboembolic event.  We discussed his increased risk of embolic disease with aflutter flutter as well as his increased risk of bleeding with anticoagulation. After share decision making, he agreed to proceed with Eliquis for stroke prevention. Recommend starting 5 mg BID.   -Start metoprolol succinate 100 mg " daily   -Check labs today including TSH  -Keep K >4 Mag >2    #Acute CHF   Mildy volume overloaded on exam +JVD .  Suspect secondary to atrial flutter.   Recommend Lasix 20 mg IVP x1 dose and monitor response   Echo pending     #HTN   BP variable- Monitor with home meds and diuresis   Will verify home medications with pharmacy     #Hx CKD  Follows with nephrology     Code Status:  Full Code    Reno Richards, APRN-CNP    ==========================  Attending note  ==========================  Both the KIKO and I have had a face to face encounter with the patient today. I have examined the patient and edited the documented physical examination as necessary.  I personally reviewed the patient's recent labs, medications, orders, EKGs, and pertinent cardiac imaging.  I have reviewed the KIKO's encounter note, approve the KIKO's documentation and have edited the note to reflect the diagnostic and therapeutic plan.    54-year-old male with a medical history of paroxysmal supraventricular tachycardia, prior syncope, hypertension, and chronic kidney disease presented with shortness of breath, particularly on exertion, and occasional palpitations. He experienced dizziness and lightheadedness the day before but denies chest pain, orthopnea, PND, lower extremity edema, syncope, fever, cough, congestion, hematochezia, or melena. Referred to the ED from urgent care due to a heart rate of 160s. Initial EKG showed atrial flutter with 2:1 conduction and HR of 163 bpm; chest X-ray showed no acute cardiopulmonary disease, and high-sensitivity troponin was negative. BNP was 574, BUN 17, creatinine 1.70, and K 3.8. Vagal maneuvers failed; treated with IV diltiazem, briefly converted to sinus rhythm, then reverted to atrial flutter. Switched from diltiazem to metoprolol tartrate 25 mg every 6 hours and started on heparin drip after cardiology consultation. He follows up at [facility] with Dr. Alex Clements, last seen on 2/29/2024, for  "SVT management. His last echocardiogram in 8/2023 showed LVEF 62% with grade I diastolic dysfunction. Home medications include Losartan 100 mg daily and Amlodipine 10 mg daily.    Past medical history:  As above.    Medications were reviewed.    Allergies were reviewed.    Vital signs, telemetry, medications, labs, and imaging were reviewed as well.    Physical Exam:  GENERAL: alert, cooperative, pleasant, in no acute distress  SKIN: warm, dry  NECK: +JVD  CARDIAC: Regular rate and rhythm no murmurs  PULMONARY: Normal respiratory efforts, bibasilar rales at bases, on room air   ABDOMEN: soft, nondistended  EXTREMITIES: no lower extremity edema  NEURO: Alert and oriented x 3.  Grossly normal.  Moves all 4 extremities.     I reviewed telemetry which showed sinus rhythm brief SVT lasting 6 seconds HR 150s at 05:23 am      Assessment/Plan   Alonso Lackey is a 54 y.o. male with past medical history significant for Paroxysmal supraventricular tachycardia, Prior syncope, Hypertension, Chronic kidney disease.  Presented with shortness of breath. Cardiology is consulted for stable atrial flutter on Cardizem drip.     #New onset atrial flutter  Initial EKG showed aflutter RVR.  Treated with diltiazem and metoprolol. Currently sinus rhythm on telemetry.   -Recommend obtaining echocardiogram   -[id number] score for Atrial Fibrillation Stroke Risk is 1  which puts pt at  \"low-moderate\" for thromboembolic event.  We discussed his increased risk of embolic event with aflutter flutter as well as his increased risk of bleeding with anticoagulation. After share decision making, he agreed to proceed with Eliquis for stroke prevention. Recommend starting 5 mg BID.   -Start metoprolol succinate 100 mg daily   -Check labs today including TSH  -Keep K >4 Mag >2    #Acute CHF   Mildy volume overloaded on exam +JVD .  Suspect secondary to atrial flutter.   Recommend Lasix 20 mg IVP x1 dose and monitor response   Echo pending "     #HTN   BP variable- Monitor with home meds and diuresis   Will verify home medications with pharmacy     #Hx CKD  Follows with nephrology     Jamari Sinclair MD

## 2024-07-19 NOTE — PROGRESS NOTES
07/19/24 1256   Discharge Planning   Living Arrangements Spouse/significant other   Support Systems Spouse/significant other   Assistance Needed independent   Type of Residence Private residence   Who is requesting discharge planning? Provider   Home or Post Acute Services None   Expected Discharge Disposition Home   Does the patient need discharge transport arranged? No   Financial Resource Strain   How hard is it for you to pay for the very basics like food, housing, medical care, and heating? Not hard   Housing Stability   In the last 12 months, was there a time when you were not able to pay the mortgage or rent on time? N   In the past 12 months, how many times have you moved where you were living? 1   At any time in the past 12 months, were you homeless or living in a shelter (including now)? N   Transportation Needs   In the past 12 months, has lack of transportation kept you from medical appointments or from getting medications? no   In the past 12 months, has lack of transportation kept you from meetings, work, or from getting things needed for daily living? No     Met with patient at bedside  Explained role of TCC  Patient states pcp Dr Carolyne Blanton  [facility] is correct in EMR    Independent with all care, will dc home today after ECHO, he is aware ECHO needed first.  Currently has active dc orders.

## 2024-07-23 NOTE — ED PROCEDURE NOTE
Procedure  Critical Care    Performed by: Ziyad Gar MD  Authorized by: Ziyad Gar MD    Critical care provider statement:     Critical care time (minutes):  35    Critical care time was exclusive of:  Separately billable procedures and treating other patients    Critical care was necessary to treat or prevent imminent or life-threatening deterioration of the following conditions: aflutter RVR requiring IV antiarrhythmic medications.    Critical care was time spent personally by me on the following activities:  Development of treatment plan with patient or surrogate, evaluation of patient's response to treatment, examination of patient, obtaining history from patient or surrogate, ordering and performing treatments and interventions, ordering and review of laboratory studies, ordering and review of radiographic studies, pulse oximetry and re-evaluation of patient's condition    Care discussed with: admitting provider                 Ziyad Gar MD  07/23/24 4703

## 2024-07-25 NOTE — DOCUMENTATION CLARIFICATION NOTE
"    PATIENT:               OXANA VO  ACCT #:                  [id number]  MRN:                       [id number]  :                       1970  ADMIT DATE:       2024 3:43 PM  DISCH DATE:        2024 6:04 PM  RESPONDING PROVIDER #:        [id number]          PROVIDER RESPONSE TEXT:    Acute Diastolic Congestive Heart Failure    CDI QUERY TEXT:    Clarification    Instruction:    Based on your assessment of the patient and the clinical information, please provide the requested documentation by clicking on the appropriate radio button and enter any additional information if prompted.    Question: Please further clarify the type and acuity of congestive heart failure    When answering this query, please exercise your independent professional judgment. The fact that a question is being asked, does not imply that any particular answer is desired or expected.    The patient's clinical indicators include:  Clinical Information: 55 y/o male presented with palpitations and SOB. Admitted with atrial flutter.    Clinical Indicators:     Echo from : LVEF 62 percent    Cardiology consult: : \"acute CHF, mildly volume overloaded on exam, + JVD\"    Treatment: Lasix 20 mg IV x 1, Hydrochlorothiazide daily, cozaar daily, metoprolol daily    Risk Factors: HTN, CKD, a flutter  Options provided:  -- Acute Diastolic Congestive Heart Failure  -- Acute on Chronic Diastolic Congestive Heart Failure  -- Chronic Diastolic Congestive Heart Failure  -- Other - I will add my own diagnosis  -- Refer to Clinical Documentation Reviewer    Query created by: Tiera Garza on 2024 11:32 AM      Electronically signed by:  MIKAL PEREZ-MICHELLE 2024 12:47 PM          "

## 2024-08-19 ENCOUNTER — OFFICE VISIT (OUTPATIENT)
Dept: CARDIOLOGY | Facility: HOSPITAL | Age: 54
End: 2024-08-19
Payer: COMMERCIAL

## 2024-08-19 VITALS
HEART RATE: 82 BPM | HEIGHT: 71 IN | DIASTOLIC BLOOD PRESSURE: 89 MMHG | SYSTOLIC BLOOD PRESSURE: 152 MMHG | WEIGHT: 260 LBS | BODY MASS INDEX: 36.4 KG/M2

## 2024-08-19 DIAGNOSIS — I48.92 ATRIAL FLUTTER, UNSPECIFIED TYPE (MULTI): Primary | ICD-10-CM

## 2024-08-19 DIAGNOSIS — I10 HYPERTENSION, UNSPECIFIED TYPE: ICD-10-CM

## 2024-08-19 LAB
ATRIAL RATE: 82 BPM
P AXIS: 45 DEGREES
P OFFSET: 195 MS
P ONSET: 137 MS
PR INTERVAL: 154 MS
Q ONSET: 214 MS
QRS COUNT: 14 BEATS
QRS DURATION: 90 MS
QT INTERVAL: 376 MS
QTC CALCULATION(BAZETT): 439 MS
QTC FREDERICIA: 417 MS
R AXIS: -30 DEGREES
T AXIS: -5 DEGREES
T OFFSET: 402 MS
VENTRICULAR RATE: 82 BPM

## 2024-08-19 PROCEDURE — 1036F TOBACCO NON-USER: CPT | Performed by: INTERNAL MEDICINE

## 2024-08-19 PROCEDURE — 93010 ELECTROCARDIOGRAM REPORT: CPT | Performed by: INTERNAL MEDICINE

## 2024-08-19 PROCEDURE — 3079F DIAST BP 80-89 MM HG: CPT | Performed by: INTERNAL MEDICINE

## 2024-08-19 PROCEDURE — 99204 OFFICE O/P NEW MOD 45 MIN: CPT | Performed by: INTERNAL MEDICINE

## 2024-08-19 PROCEDURE — 93005 ELECTROCARDIOGRAM TRACING: CPT | Performed by: INTERNAL MEDICINE

## 2024-08-19 PROCEDURE — 99214 OFFICE O/P EST MOD 30 MIN: CPT | Performed by: INTERNAL MEDICINE

## 2024-08-19 PROCEDURE — 3077F SYST BP >= 140 MM HG: CPT | Performed by: INTERNAL MEDICINE

## 2024-08-19 ASSESSMENT — PATIENT HEALTH QUESTIONNAIRE - PHQ9
1. LITTLE INTEREST OR PLEASURE IN DOING THINGS: NOT AT ALL
2. FEELING DOWN, DEPRESSED OR HOPELESS: NOT AT ALL
SUM OF ALL RESPONSES TO PHQ9 QUESTIONS 1 & 2: 0

## 2024-08-19 ASSESSMENT — ENCOUNTER SYMPTOMS
DEPRESSION: 0
OCCASIONAL FEELINGS OF UNSTEADINESS: 0
LOSS OF SENSATION IN FEET: 0

## 2024-08-19 NOTE — PATIENT INSTRUCTIONS
You have what is called atrial flutter.  This is an abnormal heart rhythm which can predispose you to stroke and cause you to have symptoms of palpitations.  You are on a blood thinner which will reduce your risk of stroke.  I am going to refer you to a heart rhythm doctor (electrophysiologist) to have a discussion as to whether you would be an appropriate candidate for an ablation, which is a procedure to treat the underlying abnormal electrical pathway in your heart.  I will see you in 6 months.

## 2024-08-19 NOTE — PROGRESS NOTES
[facility] [place]       Primary Care Physician: No primary care provider on file. PCP  Date of Visit: 2024  1:20 PM EDT     HPI / Summary:   Alonso Lackey is a 54 y.o. male with hypertension, CKD and a history of SVT who was found to have atrial flutter and thus referred for evaluation.     He had an emergency department presentation in 2024 for shortness of breath.  He woke up that AM and felt fatigued, went to work. Then he ate lunch. Stayed at work for 4 hours but he went to urgent care because he wasn't feeling well. In urgent care he was tachycardic. They sent him to the ED. He was found to be in atrial flutter with 2:1 conduction.  His high-sensitivity troponin was negative and BNP was 574.  Creatinine was 1.7 and vagal maneuvers did not convert the rhythm but he did convert with a diltiazem push and diltiazem drip. TTE on 24 showed normal LV function with no significant valvular disease.  He used to follow with cardiology at [facility]. Last seen on 2024 Dr. Alex Blake.  He has been previously followed for a history of SVT.    At his ED presentation, he was started on metoprolol 100 mg daily and apixaban 5 mg twice daily. TSH was normal.     No episodes since then. No known bleeding issues. No known stroke in the past. Has been told he snores really loudly.     ROS: Relevant review of symptoms of negative unless discussed above.     Problems:   Patient Active Problem List   Diagnosis    Atrial flutter, unspecified type (Multi)    Benign essential hypertension    HTN (hypertension)       Medical History:   Past Medical History:   Diagnosis Date    Hemorrhage, not elsewhere classified 2016    Internal hemorrhage    Other hemorrhoids 2016    Internal hemorrhoids       Surgical Hx:   No past surgical history on file.     Family Hx:   No family history on file.   Mother - no known issues -  when Derick he was 1 or 2. Froze to death in a car  "in [place], ran out of gas.   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    omeprazole (PRILOSEC) 20 mg, oral, Daily before breakfast, Do not crush or chew.       Allergies  Patient has no known allergies.    Exam:   Vitals: /89 (BP Location: Right arm, Patient Position: Sitting)   Pulse 82   Ht 1.803 m (5' 11\")   Wt 118 kg (260 lb)   BMI 36.26 kg/m²   Wt Readings from Last 5 Encounters:   24 118 kg (260 lb)   24 121 kg (266 lb 14.4 oz)     GEN: Pleasant, well-appearing, no acute distress.  HEENT: JVP not elevated  CHEST: Clear to auscultation, No wheeze, good air movement.  CV: normal rate, regular rhythm, no murmurs or rubs  EXT: Warm, well perfused, No LE edema.   NEURO: grossly non focal  SKIN: No obvious rashes     Labs:   Lipids  No results found for: \"CHOL\"  No results found for: \"HDL\"  No results found for: \"LDLCALC\"  No results found for: \"TRIG\"  No components found for: \"CHOLHDL\"    Hemoglobin A1C  Lab Results   Component Value Date    HGBA1C 5.9 (H) 2024       BMP  Lab Results   Component Value Date    GLUCOSE 89 2024    CALCIUM 8.2 (L) 2024     2024    K 3.5 2024    CO2 25 2024     2024    BUN 16 2024    CREATININE 1.51 (H) 2024         Notable Studies: imaging personally " reviewed and summarized by me below  EKG:  -8/19/24: NSR, borderline left axis, normal Qt interval, no ST changes  -7/18/2024          Echo:  -7/19/2024: LVEF 67%, no wall motion abnormalities, mild biatrial dilation, normal RV size and function, no significant valvular disease, trivial pericardial effusion    Ambulatory Rhythm Monitor:   -[employer] Enrollment Dates: 07/27/2023-08/10/2023- Per records in Care Everywhere   Patient had a min HR of 53 bpm, max HR of 214 bpm, and avg HR of 89 bpm.  Predominant underlying rhythm was Sinus Rhythm. Slight P wave morphology changes were noted. 4 Ventricular Tachycardia runs occurred, the run with the fastest interval lasting 6 beats with a max rate of 197 bpm (avg 173 bpm); the run with the fastest interval was also the longest. 57 Supraventricular Tachycardia runs occurred, the run with the fastest interval lasting 14 beats with a max rate of 214 bpm, the longest lasting 13.1 secs with an avg rate of 150 bpm. Isolated SVEs were rare (<1.0%), SVE Couplets were rare (<1.0%), and SVE Triplets were rare (<1.0%).  Isolated VEs were occasional (2.9%, 46939), VE Couplets were rare (<1.0%, 1554), and VE Triplets were rare (<1.0%, 17). Ventricular Bigeminy and Trigeminy were present.       Assessment and Plan  Alonso Lackey is a 54 y.o. male with hypertension, CKD and a history of SVT who was found to have atrial flutter and thus referred for evaluation.     1.  Atrial flutter: Despite having a long history of SVT, this was the first time (7/2024) in which atrial flutter was recognized.  His [id number] is likely 1 for hypertension.  He has no known bleeding issues.  We had a risk-benefit discussion and decided to continue anticoagulation.  Continue metoprolol  mg daily and apixaban 5 mg twice daily.  His TSH is normal and we will refer him for a sleep study.  His LVEF is normal and he has no obvious valvular heart disease.  Will refer to EP for consideration of ablation  for suspected typical flutter.  2.  Hypertension: Blood pressure mildly above goal on 8/19.  He is taking amlodipine 10 mg and losartan 100 mg.  It is unclear whether he is taking hydrochlorothiazide 12.5 as documented or 75 mg (as he reports) will need to clarify at his upcoming visit.    Follow up in 6 months.    Dennis Granados MD  Director, Sports Cardiology  [facility]    Part of this note was completed using dictation and voice recognition software. Please excuse minor errors and typos.      Time Spent: I spent 43 minutes reviewing medical testing, obtaining medical history and counselling and educating on diagnosis and documenting clinical encounter.

## 2024-08-19 NOTE — Clinical Note
Darryn Cormier, this is a patient I am sending to you in mid September for discussion about atrial flutter treatment, including potential ablation.  Thanks

## 2024-08-20 PROCEDURE — RXMED WILLOW AMBULATORY MEDICATION CHARGE

## 2024-09-04 ENCOUNTER — PHARMACY VISIT (OUTPATIENT)
Dept: PHARMACY | Facility: CLINIC | Age: 54
End: 2024-09-04
Payer: MEDICARE

## 2024-09-13 ENCOUNTER — APPOINTMENT (OUTPATIENT)
Dept: CARDIOLOGY | Facility: HOSPITAL | Age: 54
End: 2024-09-13
Payer: COMMERCIAL

## 2025-02-03 ENCOUNTER — APPOINTMENT (OUTPATIENT)
Dept: CARDIOLOGY | Facility: HOSPITAL | Age: 55
End: 2025-02-03
Payer: COMMERCIAL

## 2025-02-05 ENCOUNTER — APPOINTMENT (OUTPATIENT)
Dept: RADIOLOGY | Facility: HOSPITAL | Age: 55
End: 2025-02-05
Payer: COMMERCIAL

## 2025-02-05 ENCOUNTER — HOSPITAL ENCOUNTER (EMERGENCY)
Facility: HOSPITAL | Age: 55
Discharge: HOME | End: 2025-02-05
Payer: COMMERCIAL

## 2025-02-05 ENCOUNTER — APPOINTMENT (OUTPATIENT)
Dept: CARDIOLOGY | Facility: HOSPITAL | Age: 55
End: 2025-02-05
Payer: COMMERCIAL

## 2025-02-05 ENCOUNTER — PHARMACY VISIT (OUTPATIENT)
Dept: PHARMACY | Facility: CLINIC | Age: 55
End: 2025-02-05
Payer: MEDICARE

## 2025-02-05 VITALS
TEMPERATURE: 98.7 F | RESPIRATION RATE: 20 BRPM | SYSTOLIC BLOOD PRESSURE: 171 MMHG | DIASTOLIC BLOOD PRESSURE: 97 MMHG | BODY MASS INDEX: 35 KG/M2 | WEIGHT: 250 LBS | HEART RATE: 86 BPM | OXYGEN SATURATION: 97 % | HEIGHT: 71 IN

## 2025-02-05 DIAGNOSIS — S46.912A SHOULDER STRAIN, LEFT, INITIAL ENCOUNTER: Primary | ICD-10-CM

## 2025-02-05 LAB
ATRIAL RATE: 79 BPM
P AXIS: 65 DEGREES
P OFFSET: 205 MS
P ONSET: 146 MS
PR INTERVAL: 158 MS
Q ONSET: 225 MS
QRS COUNT: 13 BEATS
QRS DURATION: 90 MS
QT INTERVAL: 362 MS
QTC CALCULATION(BAZETT): 415 MS
QTC FREDERICIA: 396 MS
R AXIS: -26 DEGREES
T AXIS: 26 DEGREES
T OFFSET: 406 MS
VENTRICULAR RATE: 79 BPM

## 2025-02-05 PROCEDURE — 73030 X-RAY EXAM OF SHOULDER: CPT | Mod: LT

## 2025-02-05 PROCEDURE — 93005 ELECTROCARDIOGRAM TRACING: CPT

## 2025-02-05 PROCEDURE — 99284 EMERGENCY DEPT VISIT MOD MDM: CPT

## 2025-02-05 PROCEDURE — 73030 X-RAY EXAM OF SHOULDER: CPT | Mod: LEFT SIDE | Performed by: RADIOLOGY

## 2025-02-05 PROCEDURE — RXMED WILLOW AMBULATORY MEDICATION CHARGE

## 2025-02-05 RX ORDER — CYCLOBENZAPRINE HCL 5 MG
10 TABLET ORAL 3 TIMES DAILY PRN
Qty: 15 TABLET | Refills: 0 | Status: SHIPPED | OUTPATIENT
Start: 2025-02-05 | End: 2025-02-10

## 2025-02-05 ASSESSMENT — PAIN DESCRIPTION - FREQUENCY: FREQUENCY: CONSTANT/CONTINUOUS

## 2025-02-05 ASSESSMENT — PAIN - FUNCTIONAL ASSESSMENT: PAIN_FUNCTIONAL_ASSESSMENT: 0-10

## 2025-02-05 ASSESSMENT — COLUMBIA-SUICIDE SEVERITY RATING SCALE - C-SSRS
1. IN THE PAST MONTH, HAVE YOU WISHED YOU WERE DEAD OR WISHED YOU COULD GO TO SLEEP AND NOT WAKE UP?: NO
2. HAVE YOU ACTUALLY HAD ANY THOUGHTS OF KILLING YOURSELF?: NO
6. HAVE YOU EVER DONE ANYTHING, STARTED TO DO ANYTHING, OR PREPARED TO DO ANYTHING TO END YOUR LIFE?: NO

## 2025-02-05 ASSESSMENT — PAIN SCALES - GENERAL: PAINLEVEL_OUTOF10: 5 - MODERATE PAIN

## 2025-02-05 ASSESSMENT — PAIN DESCRIPTION - DESCRIPTORS: DESCRIPTORS: ACHING

## 2025-02-05 ASSESSMENT — PAIN DESCRIPTION - PAIN TYPE: TYPE: ACUTE PAIN

## 2025-02-05 ASSESSMENT — PAIN DESCRIPTION - LOCATION: LOCATION: SHOULDER

## 2025-02-05 ASSESSMENT — PAIN DESCRIPTION - ONSET: ONSET: GRADUAL

## 2025-02-05 ASSESSMENT — PAIN DESCRIPTION - ORIENTATION: ORIENTATION: LEFT

## 2025-02-05 ASSESSMENT — PAIN DESCRIPTION - PROGRESSION: CLINICAL_PROGRESSION: NOT CHANGED

## 2025-02-05 NOTE — ED TRIAGE NOTES
Patient presents to ED from home for left shoulder pain that started on Sunday night. Patient admitted to knocking icicles off of house that were very large. Patient developed the shoulder pain that night.

## 2025-02-05 NOTE — ED PROVIDER NOTES
HPI     CC: Shoulder Injury     HPI: Alonso Lackey is a 54 y.o. male with past medical history of CKD, BPH, erectile dysfunction, atrial flutter/SVT on Eliquis, and hypertension presents with left shoulder pain. 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 The pain intensified Monday night and has gotten slightly better since then but is still having pain with movement and limited ROM. He took naproxen Sunday night and has been taking ibuprofen throughout the week, has also been getting some relief using a heating pad.  He was unaware that he should not take NSAIDs while on Eliquis and with a history of CKD.  No history of injury to the shoulder. Denies numbness or tingling down the right arm.  Patient is right-handed.  Again, pain has been improving.    ROS: 10-point review of systems was performed and is otherwise negative except as noted in HPI.      Past Medical History: Noncontributory except per HPI     Past Surgical History: Noncontributory except per HPI     Family History: Reviewed and noncontributory     Social History:  Noncontributory except per HPI       No Known Allergies    Past Medical History:   Diagnosis Date    Hemorrhage, not elsewhere classified 12/06/2016    Internal hemorrhage    Other hemorrhoids 12/06/2016    Internal hemorrhoids       Home Meds:   Current Outpatient Medications   Medication Instructions    amLODIPine (NORVASC) 10 mg, oral, Daily    cyclobenzaprine (FLEXERIL) 10 mg, oral, 3 times daily PRN    Eliquis 5 mg, oral, Every 12 hours scheduled    hydroCHLOROthiazide (MICROZIDE) 12.5 mg, oral, Daily    losartan (COZAAR) 100 mg, oral, Daily    metoprolol succinate XL (TOPROL-XL) 100 mg, oral, Daily, Do not crush or chew.    omeprazole (PRILOSEC) 20 mg, oral, Daily before breakfast, Do not crush or chew.        ED Triage Vitals [02/05/25 1137]   Temperature Heart Rate Respirations BP   37.1  "°C (98.7 °F) 87 18 146/82      Pulse Ox Temp Source Heart Rate Source Patient Position   98 % Temporal Monitor Sitting      BP Location FiO2 (%)     Left arm --         Heart Rate:  [86-87]   Temperature:  [37.1 °C (98.7 °F)]   Respirations:  [18-20]   BP: (146-171)/(82-97)   Height:  [180.3 cm (5' 11\")]   Weight:  [113 kg (250 lb)]   Pulse Ox:  [97 %-98 %]      Physical Exam:  Physical Exam  Constitutional:       General: He is not in acute distress.     Appearance: Normal appearance. He is not ill-appearing.   HENT:      Head: Normocephalic and atraumatic.      Right Ear: External ear normal.      Left Ear: External ear normal.      Nose: Nose normal.      Mouth/Throat:      Mouth: Mucous membranes are moist.   Eyes:      Extraocular Movements: Extraocular movements intact.      Conjunctiva/sclera: Conjunctivae normal.      Pupils: Pupils are equal, round, and reactive to light.   Cardiovascular:      Rate and Rhythm: Normal rate and regular rhythm.      Pulses: Normal pulses.   Pulmonary:      Effort: Pulmonary effort is normal. No respiratory distress.      Breath sounds: Normal breath sounds.   Abdominal:      General: Abdomen is flat.      Palpations: Abdomen is soft.      Tenderness: There is no abdominal tenderness.   Musculoskeletal:      Cervical back: Normal range of motion and neck supple.      Comments: Left shoulder: Tenderness over the anterior lateral portion of the shoulder.  Pain does not extend into neck or chest or left arm.  Range of motion of the shoulder is significantly limited.  Patient is unable to fully lift to extended position at chest level.  Joint is not red or warm.  Patient reports pain has been improving.  2+ radial pulse.   strength 5 out of 5.  Patient is able to fully flex and extend at the elbow without difficulty.   Skin:     General: Skin is warm and dry.      Capillary Refill: Capillary refill takes less than 2 seconds.   Neurological:      General: No focal deficit " present.      Mental Status: He is alert and oriented to person, place, and time.   Psychiatric:         Mood and Affect: Mood normal.          Diagnostic Results        Labs Reviewed - No data to display      XR shoulder left 2+ views   Final Result   No evidence of acute bony abnormality of the left shoulder.        Signed by: Katy Montano 2/5/2025 12:42 PM   Dictation workstation:   [id number]                    [place] Coma Scale Score: 15                  Procedure  Procedures    ED Course & MDM   Assessment/Plan:     Medications - No data to display     ED Course as of 02/05/25 1340   Wed Feb 05, 2025   1212 ECG 12 lead  EKG on my interpretation shows normal sinus rhythm at a rate of 79 bpm, no acute ST elevations, and similar left axis deviation as prior. [EP]      ED Course User Index  [EP] Silvana Dyer PA-C         Diagnoses as of 02/05/25 1340   Shoulder strain, left, initial encounter       Medical Decision Making    Alonso Lackey is a 54 y.o. male with past medical history of CKD, BPH, erectile dysfunction, atrial flutter/SVT on Eliquis, and hypertension presents with left shoulder pain.  Patient is nontoxic-appearing and vital signs are normal.  EKG was obtained in triage to rule out ACS as a cause, see interpretation above.  Differential diagnosis includes left rotator cuff strain or partial tear, left shoulder fracture dislocation.  Low suspicion for septic joint or gout based on reassuring physical exam, improving exam, and mechanism of injury.  X-rays were obtained and showed no acute fracture or dislocation.  Sling was placed by nurse and evaluated by myself which showed appropriate placement.  Patient is neurovascularly intact.     Shoulder strain: Patient was educated about his findings on exam and x-ray.  We discussed that he most likely has a strain or partial tear of his rotator cuff.  He was placed in a sling for comfort but encouraged to continue range of motion so as to prevent  frozen shoulder.  Patient was advised he should not take NSAIDs because he is on a blood thinner and he has CKD.  Patient is agreeable to not taking this.  He was sent home with a prescription for Flexeril to try.  I did set him up with a follow-up with orthopedic injury clinic as well as orthopedic shoulder specialist if unable to be seen in the orthopedic injury clinic.  He is in between jobs at present time so did not require work note.  Advised that if the joint becomes red, warm, or with decreased range of motion, chest pain, or shortness of breath patient should return to the emergency department for repeat evaluation.  Patient agreeable to plan of care and felt comfortable returning home.    Disposition: Home    ED Prescriptions       Medication Sig Dispense Start Date End Date Auth. Provider    cyclobenzaprine (Flexeril) 5 mg tablet Take 2 tablets (10 mg) by mouth 3 times a day as needed for muscle spasms for up to 3 days. 15 tablet 2/5/2025 2/10/2025 Silvana Dyer PA-C            Social Determinants Affecting Care: none     Silvana Dyer PA-C    This note was dictated by speech recognition. Minor errors in transcription may be present.     Silvana Dyer PA-C  02/05/25 [id number]
